# Patient Record
Sex: MALE | Race: WHITE | NOT HISPANIC OR LATINO | Employment: OTHER | URBAN - NONMETROPOLITAN AREA
[De-identification: names, ages, dates, MRNs, and addresses within clinical notes are randomized per-mention and may not be internally consistent; named-entity substitution may affect disease eponyms.]

---

## 2018-04-19 ENCOUNTER — HOSPITAL ENCOUNTER (EMERGENCY)
Facility: HOSPITAL | Age: 63
Discharge: HOME | End: 2018-04-20
Attending: EMERGENCY MEDICINE | Admitting: EMERGENCY MEDICINE

## 2018-04-19 DIAGNOSIS — R04.2 COUGH WITH HEMOPTYSIS: Primary | ICD-10-CM

## 2018-04-19 LAB
BASOPHILS # BLD AUTO: 0.01 10*3/MM3 (ref 0–0.3)
BASOPHILS NFR BLD AUTO: 0 % (ref 0–2)
DEPRECATED RDW RBC AUTO: 65.3 FL (ref 37–54)
EOSINOPHIL # BLD AUTO: 0.01 10*3/MM3 (ref 0–0.7)
EOSINOPHIL NFR BLD AUTO: 0 % (ref 0–7)
ERYTHROCYTE [DISTWIDTH] IN BLOOD BY AUTOMATED COUNT: 21.3 % (ref 11.5–14.5)
HCT VFR BLD AUTO: 41.5 % (ref 42–52)
HGB BLD-MCNC: 12.8 G/DL (ref 14–18)
IMM GRANULOCYTES # BLD: 0.06 10*3/MM3 (ref 0–0.03)
IMM GRANULOCYTES NFR BLD: 0.3 % (ref 0–0.5)
LYMPHOCYTES # BLD AUTO: 0.96 10*3/MM3 (ref 1–3)
LYMPHOCYTES NFR BLD AUTO: 4.4 % (ref 16–46)
MCH RBC QN AUTO: 26.6 PG (ref 27–33)
MCHC RBC AUTO-ENTMCNC: 30.8 G/DL (ref 33–37)
MCV RBC AUTO: 86.1 FL (ref 80–94)
MONOCYTES # BLD AUTO: 1.62 10*3/MM3 (ref 0.1–0.9)
MONOCYTES NFR BLD AUTO: 7.5 % (ref 0–12)
NEUTROPHILS # BLD AUTO: 18.93 10*3/MM3 (ref 1.4–6.5)
NEUTROPHILS NFR BLD AUTO: 87.8 % (ref 40–75)
PLATELET # BLD AUTO: 211 10*3/MM3 (ref 130–400)
PMV BLD AUTO: 12 FL (ref 6–10)
RBC # BLD AUTO: 4.82 10*6/MM3 (ref 4.7–6.1)
WBC NRBC COR # BLD: 21.59 10*3/MM3 (ref 4.5–12.5)

## 2018-04-19 PROCEDURE — 36600 WITHDRAWAL OF ARTERIAL BLOOD: CPT | Performed by: EMERGENCY MEDICINE

## 2018-04-19 PROCEDURE — 85025 COMPLETE CBC W/AUTO DIFF WBC: CPT | Performed by: EMERGENCY MEDICINE

## 2018-04-19 PROCEDURE — 82550 ASSAY OF CK (CPK): CPT | Performed by: EMERGENCY MEDICINE

## 2018-04-19 PROCEDURE — 85610 PROTHROMBIN TIME: CPT | Performed by: EMERGENCY MEDICINE

## 2018-04-19 PROCEDURE — 85730 THROMBOPLASTIN TIME PARTIAL: CPT | Performed by: EMERGENCY MEDICINE

## 2018-04-19 PROCEDURE — 93005 ELECTROCARDIOGRAM TRACING: CPT | Performed by: EMERGENCY MEDICINE

## 2018-04-19 PROCEDURE — 82553 CREATINE MB FRACTION: CPT | Performed by: EMERGENCY MEDICINE

## 2018-04-19 PROCEDURE — 85379 FIBRIN DEGRADATION QUANT: CPT | Performed by: EMERGENCY MEDICINE

## 2018-04-19 PROCEDURE — 93010 ELECTROCARDIOGRAM REPORT: CPT | Performed by: INTERNAL MEDICINE

## 2018-04-19 PROCEDURE — 84484 ASSAY OF TROPONIN QUANT: CPT | Performed by: EMERGENCY MEDICINE

## 2018-04-19 PROCEDURE — 96360 HYDRATION IV INFUSION INIT: CPT

## 2018-04-19 PROCEDURE — 82805 BLOOD GASES W/O2 SATURATION: CPT | Performed by: EMERGENCY MEDICINE

## 2018-04-19 PROCEDURE — 80053 COMPREHEN METABOLIC PANEL: CPT | Performed by: EMERGENCY MEDICINE

## 2018-04-19 PROCEDURE — 96361 HYDRATE IV INFUSION ADD-ON: CPT

## 2018-04-19 PROCEDURE — 99285 EMERGENCY DEPT VISIT HI MDM: CPT

## 2018-04-19 PROCEDURE — 83050 HGB METHEMOGLOBIN QUAN: CPT | Performed by: EMERGENCY MEDICINE

## 2018-04-19 PROCEDURE — 82375 ASSAY CARBOXYHB QUANT: CPT | Performed by: EMERGENCY MEDICINE

## 2018-04-19 RX ORDER — WARFARIN SODIUM 5 MG/1
5 TABLET ORAL
COMMUNITY

## 2018-04-19 RX ORDER — CLOPIDOGREL BISULFATE 75 MG/1
75 TABLET ORAL DAILY
COMMUNITY

## 2018-04-19 RX ORDER — SODIUM CHLORIDE 0.9 % (FLUSH) 0.9 %
10 SYRINGE (ML) INJECTION AS NEEDED
Status: DISCONTINUED | OUTPATIENT
Start: 2018-04-19 | End: 2018-04-20 | Stop reason: HOSPADM

## 2018-04-19 RX ORDER — SODIUM CHLORIDE 9 MG/ML
125 INJECTION, SOLUTION INTRAVENOUS CONTINUOUS
Status: DISCONTINUED | OUTPATIENT
Start: 2018-04-19 | End: 2018-04-20 | Stop reason: HOSPADM

## 2018-04-19 RX ADMIN — SODIUM CHLORIDE 125 ML/HR: 9 INJECTION, SOLUTION INTRAVENOUS at 23:41

## 2018-04-20 ENCOUNTER — APPOINTMENT (OUTPATIENT)
Dept: GENERAL RADIOLOGY | Facility: HOSPITAL | Age: 63
End: 2018-04-20

## 2018-04-20 ENCOUNTER — APPOINTMENT (OUTPATIENT)
Dept: CT IMAGING | Facility: HOSPITAL | Age: 63
End: 2018-04-20

## 2018-04-20 VITALS
BODY MASS INDEX: 28.63 KG/M2 | WEIGHT: 200 LBS | SYSTOLIC BLOOD PRESSURE: 132 MMHG | TEMPERATURE: 98.5 F | OXYGEN SATURATION: 100 % | HEART RATE: 90 BPM | HEIGHT: 70 IN | DIASTOLIC BLOOD PRESSURE: 73 MMHG | RESPIRATION RATE: 18 BRPM

## 2018-04-20 LAB
A-A DO2: 17.6 MMHG (ref 0–300)
ALBUMIN SERPL-MCNC: 4.4 G/DL (ref 3.4–4.8)
ALBUMIN/GLOB SERPL: 1.4 G/DL (ref 1.5–2.5)
ALP SERPL-CCNC: 83 U/L (ref 40–129)
ALT SERPL W P-5'-P-CCNC: 48 U/L (ref 10–44)
ANION GAP SERPL CALCULATED.3IONS-SCNC: 6.8 MMOL/L (ref 3.6–11.2)
APTT PPP: 41.1 SECONDS (ref 23.8–36.1)
ARTERIAL PATENCY WRIST A: ABNORMAL
AST SERPL-CCNC: 62 U/L (ref 10–34)
ATMOSPHERIC PRESS: 726 MMHG
BASE EXCESS BLDA CALC-SCNC: -4.1 MMOL/L
BDY SITE: ABNORMAL
BILIRUB SERPL-MCNC: 0.7 MG/DL (ref 0.2–1.8)
BILIRUB UR QL STRIP: NEGATIVE
BODY TEMPERATURE: 98.5 C
BUN BLD-MCNC: 17 MG/DL (ref 7–21)
BUN/CREAT SERPL: 20.7 (ref 7–25)
CALCIUM SPEC-SCNC: 9.5 MG/DL (ref 7.7–10)
CHLORIDE SERPL-SCNC: 108 MMOL/L (ref 99–112)
CK MB SERPL-CCNC: 3.8 NG/ML (ref 0–5)
CK MB SERPL-CCNC: 4.91 NG/ML (ref 0–5)
CK MB SERPL-RTO: 5.3 % (ref 0–3)
CK MB SERPL-RTO: 6.6 % (ref 0–3)
CK SERPL-CCNC: 58 U/L (ref 24–204)
CK SERPL-CCNC: 92 U/L (ref 24–204)
CLARITY UR: CLEAR
CO2 SERPL-SCNC: 22.2 MMOL/L (ref 24.3–31.9)
COHGB MFR BLD: 1.9 % (ref 0–5)
COLOR UR: YELLOW
CREAT BLD-MCNC: 0.82 MG/DL (ref 0.43–1.29)
D DIMER PPP FEU-MCNC: <0.27 MCGFEU/ML (ref 0–0.5)
GFR SERPL CREATININE-BSD FRML MDRD: 94 ML/MIN/1.73
GLOBULIN UR ELPH-MCNC: 3.1 GM/DL
GLUCOSE BLD-MCNC: 202 MG/DL (ref 70–110)
GLUCOSE UR STRIP-MCNC: NEGATIVE MG/DL
HCO3 BLDA-SCNC: 18.6 MMOL/L (ref 22–26)
HCT VFR BLD CALC: 40 % (ref 42–52)
HGB BLDA-MCNC: 13.7 G/DL (ref 12–16)
HGB UR QL STRIP.AUTO: NEGATIVE
HOROWITZ INDEX BLD+IHG-RTO: 21 %
INR PPP: 3.21 (ref 0.9–1.1)
KETONES UR QL STRIP: NEGATIVE
LEUKOCYTE ESTERASE UR QL STRIP.AUTO: NEGATIVE
METHGB BLD QL: 0.3 % (ref 0–3)
MODALITY: ABNORMAL
NITRITE UR QL STRIP: NEGATIVE
OSMOLALITY SERPL CALC.SUM OF ELEC: 281.1 MOSM/KG (ref 273–305)
OXYHGB MFR BLDV: 94.9 % (ref 85–100)
PCO2 BLDA: 27.9 MM HG (ref 35–45)
PH BLDA: 7.44 PH UNITS (ref 7.35–7.45)
PH UR STRIP.AUTO: 5.5 [PH] (ref 5–8)
PO2 BLDA: 92.4 MM HG (ref 80–100)
POTASSIUM BLD-SCNC: 5.5 MMOL/L (ref 3.5–5.3)
PROT SERPL-MCNC: 7.5 G/DL (ref 6–8)
PROT UR QL STRIP: NEGATIVE
PROTHROMBIN TIME: 33.3 SECONDS (ref 11–15.4)
SAO2 % BLDCOA: 97 % (ref 90–100)
SODIUM BLD-SCNC: 137 MMOL/L (ref 135–153)
SP GR UR STRIP: 1.01 (ref 1–1.03)
TROPONIN I SERPL-MCNC: 0.07 NG/ML
TROPONIN I SERPL-MCNC: 0.07 NG/ML
UROBILINOGEN UR QL STRIP: NORMAL

## 2018-04-20 PROCEDURE — 84484 ASSAY OF TROPONIN QUANT: CPT | Performed by: EMERGENCY MEDICINE

## 2018-04-20 PROCEDURE — 71045 X-RAY EXAM CHEST 1 VIEW: CPT

## 2018-04-20 PROCEDURE — 82553 CREATINE MB FRACTION: CPT | Performed by: EMERGENCY MEDICINE

## 2018-04-20 PROCEDURE — 82550 ASSAY OF CK (CPK): CPT | Performed by: EMERGENCY MEDICINE

## 2018-04-20 PROCEDURE — 71275 CT ANGIOGRAPHY CHEST: CPT | Performed by: RADIOLOGY

## 2018-04-20 PROCEDURE — 81003 URINALYSIS AUTO W/O SCOPE: CPT | Performed by: EMERGENCY MEDICINE

## 2018-04-20 PROCEDURE — 71045 X-RAY EXAM CHEST 1 VIEW: CPT | Performed by: RADIOLOGY

## 2018-04-20 PROCEDURE — 71275 CT ANGIOGRAPHY CHEST: CPT

## 2018-04-20 PROCEDURE — 0 IOPAMIDOL PER 1 ML: Performed by: EMERGENCY MEDICINE

## 2018-04-20 RX ORDER — ALBUTEROL SULFATE 90 UG/1
2-4 AEROSOL, METERED RESPIRATORY (INHALATION) EVERY 4 HOURS PRN
Qty: 1 INHALER | Refills: 0 | Status: SHIPPED | OUTPATIENT
Start: 2018-04-20

## 2018-04-20 RX ORDER — FERROUS SULFATE 325(65) MG
325 TABLET ORAL
COMMUNITY

## 2018-04-20 RX ORDER — FUROSEMIDE 40 MG/1
40 TABLET ORAL 2 TIMES DAILY
COMMUNITY

## 2018-04-20 RX ORDER — LOSARTAN POTASSIUM 25 MG/1
25 TABLET ORAL DAILY
COMMUNITY

## 2018-04-20 RX ORDER — SIMVASTATIN 20 MG
20 TABLET ORAL NIGHTLY
COMMUNITY

## 2018-04-20 RX ORDER — CLINDAMYCIN HYDROCHLORIDE 300 MG/1
300 CAPSULE ORAL 3 TIMES DAILY
COMMUNITY

## 2018-04-20 RX ORDER — SPIRONOLACTONE 25 MG/1
25 TABLET ORAL DAILY
COMMUNITY

## 2018-04-20 RX ORDER — BENZONATATE 100 MG/1
100 CAPSULE ORAL 3 TIMES DAILY PRN
COMMUNITY

## 2018-04-20 RX ADMIN — IOPAMIDOL 95 ML: 755 INJECTION, SOLUTION INTRAVENOUS at 02:22

## 2018-04-20 NOTE — ED NOTES
"Patient presents to Emergency Department with complaints of \"coughing up blood\" x2 days.      Mery Darling RN  04/20/18 0417    "

## 2018-04-20 NOTE — ED PROVIDER NOTES
Subjective   Pt comes in with concern for multiple episodes of coughing up bright red blood the past few days.  Worse today.  Has had left sided CP.  Pt attributed discomfort to coughing.  No fever, chills.  No NVD.  Pain is non-radiating  Pt is from out of [facility]        History provided by:  Patient  Chest Pain   Pain location:  L chest  Pain quality: dull    Pain radiates to:  Does not radiate  Pain severity:  Moderate  Onset quality:  Gradual  Duration: Today.  Timing:  Constant  Progression:  Unchanged  Chronicity:  New  Context: breathing    Context: not drug use, not eating, not intercourse, not lifting, not movement, not raising an arm, not at rest, not stress and not trauma    Relieved by:  Nothing  Worsened by:  Coughing  Ineffective treatments:  None tried  Associated symptoms: cough    Associated symptoms: no abdominal pain, no AICD problem, no altered mental status, no anorexia, no anxiety, no back pain, no claudication, no diaphoresis, no dizziness, no dysphagia, no fatigue, no fever, no headache, no heartburn, no lower extremity edema, no nausea, no near-syncope, no numbness, no orthopnea, no palpitations, no PND, no shortness of breath, no syncope, no vomiting and no weakness    Risk factors: coronary artery disease, high cholesterol, hypertension and male sex    Risk factors: no aortic disease, no birth control, no Mandeep-Danlos syndrome, no immobilization, no Marfan's syndrome, not obese, not pregnant, no prior DVT/PE, no smoking and no surgery        Review of Systems   Constitutional: Positive for activity change. Negative for diaphoresis, fatigue and fever.   HENT: Negative.  Negative for nosebleeds and trouble swallowing.    Eyes: Negative.    Respiratory: Positive for cough. Negative for chest tightness, shortness of breath and wheezing.    Cardiovascular: Positive for chest pain. Negative for palpitations, orthopnea, claudication, syncope, PND and near-syncope.    Gastrointestinal: Negative.  Negative for abdominal pain, anorexia, heartburn, nausea and vomiting.   Endocrine: Negative.    Genitourinary: Negative.    Musculoskeletal: Negative.  Negative for back pain.   Skin: Negative.    Allergic/Immunologic: Negative.    Neurological: Negative for dizziness, weakness, numbness and headaches.   Hematological: Bruises/bleeds easily.   Psychiatric/Behavioral: Negative.    All other systems reviewed and are negative.      History reviewed. No pertinent past medical history.    No Known Allergies    Past Surgical History:   Procedure Laterality Date   • MITRAL VALVE REPLACEMENT     • PACEMAKER IMPLANTATION         History reviewed. No pertinent family history.    Social History     Social History   • Marital status:      Social History Main Topics   • Smoking status: Former Smoker   • Smokeless tobacco: Never Used   • Alcohol use 14.4 oz/week     24 Cans of beer per week   • Drug use: No   • Sexual activity: Defer     Other Topics Concern   • Not on file           Objective   Physical Exam   Constitutional: He is oriented to person, place, and time. He appears well-developed and well-nourished. No distress.   HENT:   Head: Normocephalic and atraumatic.   Nose: Nose normal.   Mouth/Throat: Oropharynx is clear and moist. No oropharyngeal exudate.   Eyes: Conjunctivae and EOM are normal. Pupils are equal, round, and reactive to light. Right eye exhibits no discharge. Left eye exhibits no discharge. No scleral icterus.   Neck: Normal range of motion. Neck supple. No JVD present. No tracheal deviation present. No thyromegaly present.   Cardiovascular: Intact distal pulses.  Exam reveals no gallop and no friction rub.    No murmur heard.  Regular tachycardia.  Mechanical heart valve click   Pulmonary/Chest: Effort normal and breath sounds normal. No stridor. No respiratory distress. He has no wheezes. He has no rales.   Abdominal: Soft. He exhibits no distension and no mass.  There is no tenderness. There is no guarding.   Genitourinary:   Genitourinary Comments: No CVAT   Musculoskeletal: Normal range of motion. He exhibits no deformity.   Lymphadenopathy:     He has no cervical adenopathy.   Neurological: He is alert and oriented to person, place, and time. He exhibits normal muscle tone. Coordination normal.   Skin: Skin is warm and dry. Capillary refill takes less than 2 seconds. No pallor.   Psychiatric: He has a normal mood and affect. His behavior is normal. Judgment and thought content normal.   Nursing note and vitals reviewed.      Procedures         ED Course  ED Course   Value Comment By Time   ECG 12 Lead 12-lead EKG performed at 2344 hrs.  Interpreted by me at 2347 hrs.  Atrial fibrillation.  Ventricular rate 101.  QRS duration 110.  .  .  No acute ischemic ST segment elevation.  Nonspecific nonanatomic T wave inversion.  Single PVC.  No criteria for acute infarct/STEMI. Bharathi Eagle MD 04/20 8251    Pt currently on steroids, tessalon and clindamycin from Urgent Care. Bharathi Eagle MD 04/20 1472      CT Chest Pulmonary Embolism With Contrast   ED Interpretation   CT angiography chest with IV contrast   Faxed report from virtual radiologic   Impression:   1.  No pulmonary embolus.   2.  Cardiomegaly and mild dependent interstitial prominence in the lungs.  No consolidation or pleural effusion.   3.  Mediastinal soft tissue density adenopathy is nonspecific and a few calcified pulmonary nodules may be old granulomatous changes.   Signed Kareem Cary M.D.      XR Chest 1 View   ED Interpretation   AP chest x-ray   My read   Cardiomegaly.  Pacemaker in place.  No apparent acute focal infiltrate.        Labs Reviewed   COMPREHENSIVE METABOLIC PANEL - Abnormal; Notable for the following:        Result Value    Glucose 202 (*)     Potassium 5.5 (*)     CO2 22.2 (*)     ALT (SGPT) 48 (*)     AST (SGOT) 62 (*)     A/G Ratio 1.4 (*)     All other  components within normal limits   PROTIME-INR - Abnormal; Notable for the following:     Protime 33.3 (*)     INR 3.21 (*)     All other components within normal limits    Narrative:     Suggested INR therapeutic range for stable oral anticoagulant therapy:    Low Intensity therapy:   1.5-2.0  Moderate Intensity therapy:   2.0-3.0  High Intensity therapy:   2.5-4.0   APTT - Abnormal; Notable for the following:     PTT 41.1 (*)     All other components within normal limits    Narrative:     PTT Heparin Therapeutic Range:  59 - 95 seconds   TROPONIN (IN-HOUSE) - Abnormal; Notable for the following:     Troponin I 0.071 (*)     All other components within normal limits    Narrative:     Ultra Troponin I Reference Range:         <=0.039 ng/mL: Negative    0.04-0.779 ng/mL: Indeterminate Range. Suspicious of MI.  Clinical correlation required.       >=0.78  ng/mL: Consistent with myocardial injury.  Clinical correlation required.   BLOOD GAS, ARTERIAL - Abnormal; Notable for the following:     pCO2, Arterial 27.9 (*)     HCO3, Arterial 18.6 (*)     Hematocrit, Blood Gas 40.0 (*)     All other components within normal limits   CBC WITH AUTO DIFFERENTIAL - Abnormal; Notable for the following:     WBC 21.59 (*)     Hemoglobin 12.8 (*)     Hematocrit 41.5 (*)     MCH 26.6 (*)     MCHC 30.8 (*)     RDW 21.3 (*)     RDW-SD 65.3 (*)     MPV 12.0 (*)     Neutrophil % 87.8 (*)     Lymphocyte % 4.4 (*)     Neutrophils, Absolute 18.93 (*)     Lymphocytes, Absolute 0.96 (*)     Monocytes, Absolute 1.62 (*)     Immature Grans, Absolute 0.06 (*)     All other components within normal limits   CKMB INDEX CALCULATION - Abnormal; Notable for the following:     CK-MB Index 5.3 (*)     All other components within normal limits   TROPONIN (IN-HOUSE) - Abnormal; Notable for the following:     Troponin I 0.068 (*)     All other components within normal limits    Narrative:     Ultra Troponin I Reference Range:         <=0.039 ng/mL:  Negative    0.04-0.779 ng/mL: Indeterminate Range. Suspicious of MI.  Clinical correlation required.       >=0.78  ng/mL: Consistent with myocardial injury.  Clinical correlation required.   CKMB INDEX CALCULATION - Abnormal; Notable for the following:     CK-MB Index 6.6 (*)     All other components within normal limits   D-DIMER, QUANTITATIVE - Normal    Narrative:     d-Dimer assay result is to be used in conjunction with a non-high clinical pretest probability (PTP) assessment tool to exclude PE and aid in diagnosis of VTE with cutoff of 0.5 MCGFEU/mL.   CK - Normal   CK MB - Normal   URINALYSIS W/ CULTURE IF INDICATED - Normal    Narrative:     Urine microscopic not indicated.   OSMOLALITY, CALCULATED - Normal   CK - Normal   CK MB - Normal   CBC AND DIFFERENTIAL    Narrative:     The following orders were created for panel order CBC & Differential.  Procedure                               Abnormality         Status                     ---------                               -----------         ------                     CBC Auto Differential[[id number]]        Abnormal            Final result                 Please view results for these tests on the individual orders.        Medication List      START taking these medications    albuterol 108 (90 Base) MCG/ACT inhaler  Commonly known as:  PROVENTIL HFA;VENTOLIN HFA  Inhale 2-4 puffs Every 4 (Four) Hours As Needed for Shortness of Air.        CONTINUE taking these medications    benzonatate 100 MG capsule  Commonly known as:  TESSALON     clindamycin 300 MG capsule  Commonly known as:  CLEOCIN     clopidogrel 75 MG tablet  Commonly known as:  PLAVIX     ferrous sulfate 325 (65 FE) MG tablet     furosemide 40 MG tablet  Commonly known as:  LASIX     losartan 25 MG tablet  Commonly known as:  COZAAR     simvastatin 20 MG tablet  Commonly known as:  ZOCOR     spironolactone 25 MG tablet  Commonly known as:  ALDACTONE     warfarin 5 MG tablet  Commonly known as:   COUMADIN              MDM  Number of Diagnoses or Management Options  Cough with hemoptysis: new and requires workup     Amount and/or Complexity of Data Reviewed  Clinical lab tests: ordered and reviewed  Tests in the radiology section of CPT®: ordered and reviewed  Independent visualization of images, tracings, or specimens: yes    Risk of Complications, Morbidity, and/or Mortality  Presenting problems: high  Diagnostic procedures: high  Management options: high    Patient Progress  Patient progress: stable      Final diagnoses:   Cough with hemoptysis            Bharathi Eagle MD  04/20/18 034